# Patient Record
Sex: FEMALE | Race: WHITE | Employment: UNEMPLOYED | URBAN - METROPOLITAN AREA
[De-identification: names, ages, dates, MRNs, and addresses within clinical notes are randomized per-mention and may not be internally consistent; named-entity substitution may affect disease eponyms.]

---
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
## 2022-02-24 ENCOUNTER — TELEMEDICINE (OUTPATIENT)
Dept: PSYCHOLOGY | Age: 22
End: 2022-02-24
Payer: COMMERCIAL

## 2022-02-24 DIAGNOSIS — F41.8 OTHER SPECIFIED ANXIETY DISORDERS: Primary | ICD-10-CM

## 2022-02-24 PROCEDURE — 90832 PSYTX W PT 30 MINUTES: CPT | Performed by: PSYCHOLOGIST

## 2022-02-24 NOTE — PROGRESS NOTES
amounts, but of unhealthy foods such as processed peanut butter crackers and pizza. Discussed how to use smart goals for diet change. Suggested that she research the FDA my plate standards. She will start with adding breakfast to her diet. She is also exercising, advised that she talk to someone at her gym who can recommend an exercise plan. Treatment plan goal(s) addressed: To reduce the frequency and severity of anxiety. Homework/recommendations:  See above    Progress and patient response since intake/last session:  Maintaining past treatment gains. She has made very good progress, continuing on intermittent maintenance. Will call for next appointment when needed.         Electronically signed by Jim Bowen, PhD

## 2023-01-12 ENCOUNTER — TELEMEDICINE (OUTPATIENT)
Dept: PSYCHOLOGY | Age: 23
End: 2023-01-12
Payer: COMMERCIAL

## 2023-01-12 DIAGNOSIS — F41.8 OTHER SPECIFIED ANXIETY DISORDERS: Primary | ICD-10-CM

## 2023-01-12 PROCEDURE — 90837 PSYTX W PT 60 MINUTES: CPT | Performed by: PSYCHOLOGIST

## 2023-01-12 ASSESSMENT — ANXIETY QUESTIONNAIRES
7. FEELING AFRAID AS IF SOMETHING AWFUL MIGHT HAPPEN: 0
3. WORRYING TOO MUCH ABOUT DIFFERENT THINGS: 1
GAD7 TOTAL SCORE: 5
IF YOU CHECKED OFF ANY PROBLEMS ON THIS QUESTIONNAIRE, HOW DIFFICULT HAVE THESE PROBLEMS MADE IT FOR YOU TO DO YOUR WORK, TAKE CARE OF THINGS AT HOME, OR GET ALONG WITH OTHER PEOPLE: NOT DIFFICULT AT ALL
6. BECOMING EASILY ANNOYED OR IRRITABLE: 2
5. BEING SO RESTLESS THAT IT IS HARD TO SIT STILL: 0
2. NOT BEING ABLE TO STOP OR CONTROL WORRYING: 0
1. FEELING NERVOUS, ANXIOUS, OR ON EDGE: 1
4. TROUBLE RELAXING: 1

## 2023-01-12 ASSESSMENT — PATIENT HEALTH QUESTIONNAIRE - PHQ9
SUM OF ALL RESPONSES TO PHQ QUESTIONS 1-9: 6
SUM OF ALL RESPONSES TO PHQ QUESTIONS 1-9: 6
9. THOUGHTS THAT YOU WOULD BE BETTER OFF DEAD, OR OF HURTING YOURSELF: 0
5. POOR APPETITE OR OVEREATING: 1
2. FEELING DOWN, DEPRESSED OR HOPELESS: 0
4. FEELING TIRED OR HAVING LITTLE ENERGY: 0
7. TROUBLE CONCENTRATING ON THINGS, SUCH AS READING THE NEWSPAPER OR WATCHING TELEVISION: 0
6. FEELING BAD ABOUT YOURSELF - OR THAT YOU ARE A FAILURE OR HAVE LET YOURSELF OR YOUR FAMILY DOWN: 1
SUM OF ALL RESPONSES TO PHQ QUESTIONS 1-9: 6
1. LITTLE INTEREST OR PLEASURE IN DOING THINGS: 1
10. IF YOU CHECKED OFF ANY PROBLEMS, HOW DIFFICULT HAVE THESE PROBLEMS MADE IT FOR YOU TO DO YOUR WORK, TAKE CARE OF THINGS AT HOME, OR GET ALONG WITH OTHER PEOPLE: 0
8. MOVING OR SPEAKING SO SLOWLY THAT OTHER PEOPLE COULD HAVE NOTICED. OR THE OPPOSITE, BEING SO FIGETY OR RESTLESS THAT YOU HAVE BEEN MOVING AROUND A LOT MORE THAN USUAL: 2
SUM OF ALL RESPONSES TO PHQ9 QUESTIONS 1 & 2: 1
3. TROUBLE FALLING OR STAYING ASLEEP: 1
SUM OF ALL RESPONSES TO PHQ QUESTIONS 1-9: 6

## 2023-01-12 NOTE — PROGRESS NOTES
[address] [place] [address]    TeleMedicine Patient Consent    This visit was performed as a virtual video visit using a synchronous, two-way, audio-video telehealth technology platform. Patient identification was verified at the start of the visit, including the patient's telephone number and physical location. I discussed with the patient the nature of our telehealth visits, that:     There are potential benefits and risks of video-conferencing (e.g. limits to patient confidentiality) that differ from in-person sessions. Confidentiality still applies for telepsychology services, and nobody will record the session without the permission from the others person(s). We agree to use the video-conferencing platform selected for our virtual sessions, and the psychologist will explain how to use it. You need to use a webcam or smartphone during the session. It is important to be in a quiet, private space that is free of distractions (including cell phone or other devices) during the session. It is important to use a secure internet connection rather than public/free Wi-Fi. We need a back-up plan (e.g., phone number where you can be reached) to restart the session or to reschedule it, in the event of technical problems. We need a safety plan that includes at least one emergency contact and the closest ER to your location, in the event of a crisis situation. If you are not an adult, we need the permission of your parent or legal guardian (and their contact information) for you to participate in telepsychology sessions. As your psychologist, I may determine that due to certain circumstances, telepsychology is no longer appropriate and that we should resume our sessions in-person.    As a TeleMedicine visit, this encounter will generate charges, similar to office visits, which may or may not be fully paid by the insurance, so the patient may be financially responsible for this encounter. Patient does agree to proceed with telemedicine consultation. Patient's location: home address in [place] [phone]    Provider location:  Office - [facility]         [facility]    *Add 95 modifier to all Video Visits*    Time Start: 9:40 a.m. Time End:  10:35 a.m. Date of Service:  1/12/2023  Session #: 8    Subjective:  Resuming therapy after last being treated by this provider in Feb 2022. Has had increased stressors, see below. Objective:  Mental status:    Appearance: Appears stated age   Affect:  consistent with expectations based upon mood   Mood:   Anxious   Thought Process:  Linear and goal directed   Thought Content: no evidence of psychosis   Behavior:   Cooperative   Psychomotor: Within normal limits   Speech: Within normal limits   Eye Contact: fair   Orientation:  oriented to person, place, time, and general circumstances   Judgment & Insight:  normal insight and judgment     Objective Testing:  PHQ-9 = 6; PARKER-7 = 5    Assessment:   Progress/response since intake/last session: Doing well in college classes. Getting along well with family. 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 Risk: denies current suicidal ideation, plan and intent; denies current homicidal ideation, plan and intent. Protective Factors: denial of impulses, future goals, and making progress in treatment      ICD-10-CM    1. Other specified anxiety disorders  F41.8           Psychotherapy Intervention:  Modalities: Review of symptoms; Cognitive Behavioral Therapy    Melquiades Chan reported she scheduled the appointment today to discuss some distress about an incident when she was 8years old. She has cerebral palsy and had a surgery at that time on her left leg to improve her mobility (she has cerebral palsy).   Stated a family friend who is a physical therapist was helping her with mobility and walking shortly after the surgery, and she felt pressured to walk a few days after the surgery and to do so in front of several family members. This has started to bother her again with no clear trigger. Discussed how to find peace with this situation by forgiving this individual through understanding her point of view. She also understands that doing physical therapy exercises as quickly as possible after surgery may have likely been what allows her to have the amount of mobility she has today. She is having some anxiety about her upcoming coursework. Taking two virtual classes through [employer] She is unclear about the requirements for one of her classes. Encouraged her to set up a meeting with the instructor to clarify. Working at [employer]. 19-20 hrs/ week. Feels she cannot manage the current work schedule with her classes; discussed how to negotiate with her supervisor on the issue. Overall her panic attacks are reduced in frequency compared to the past.  She has had just 1 in the past month. Yesterday she had a 5 to 10-minute panic attack while exercising, went to the restroom to cry and was able to resume her exercise. Exercises 3 to 4 days/week. She is working on improving her diet, for example choosing healthier options for snacks. Feels her mother is a good source of support at this time, and they have worked through many of the conflicts discussed with this provider in the past.    [employer]. No drivers license. No insomnia - sleeps 7-8 hours per night. Denies alcohol, drugs, and tobacco. Will be Cousin's maid of honor July 29. Treatment goal(s) addressed: To reduce the frequency and severity of anxiety. Will create updated treatment plan next visit. Plan:  Homework/recommendations: See above    Follow up:  2-3 weeks, scheduled for 1/30/23.      Electronically signed by Kemal Quispe PhD    _____________________________________________________________________________________

## 2023-02-09 ENCOUNTER — TELEMEDICINE (OUTPATIENT)
Dept: PSYCHOLOGY | Age: 23
End: 2023-02-09
Payer: COMMERCIAL

## 2023-02-09 DIAGNOSIS — F41.8 OTHER SPECIFIED ANXIETY DISORDERS: Primary | ICD-10-CM

## 2023-02-09 PROCEDURE — 90832 PSYTX W PT 30 MINUTES: CPT | Performed by: PSYCHOLOGIST

## 2023-02-10 NOTE — PROGRESS NOTES
[address] [place] [address]    TeleMedicine Patient Consent    This visit was performed as a virtual video visit using a synchronous, two-way, audio-video telehealth technology platform. Patient identification was verified at the start of the visit, including the patient's telephone number and physical location. I discussed with the patient the nature of our telehealth visits, that:     There are potential benefits and risks of video-conferencing (e.g. limits to patient confidentiality) that differ from in-person sessions. Confidentiality still applies for telepsychology services, and nobody will record the session without the permission from the others person(s). We agree to use the video-conferencing platform selected for our virtual sessions, and the psychologist will explain how to use it. You need to use a webcam or smartphone during the session. It is important to be in a quiet, private space that is free of distractions (including cell phone or other devices) during the session. It is important to use a secure internet connection rather than public/free Wi-Fi. We need a back-up plan (e.g., phone number where you can be reached) to restart the session or to reschedule it, in the event of technical problems. We need a safety plan that includes at least one emergency contact and the closest ER to your location, in the event of a crisis situation. If you are not an adult, we need the permission of your parent or legal guardian (and their contact information) for you to participate in telepsychology sessions. As your psychologist, I may determine that due to certain circumstances, telepsychology is no longer appropriate and that we should resume our sessions in-person.    As a TeleMedicine visit, this encounter will generate charges, similar to office visits, which may or may not be fully paid by the insurance, so the patient may be financially responsible for this encounter. Patient does agree to proceed with telemedicine consultation. Patient's location: home address in [address] [address]    Provider location:  Office - [facility] Yanique modifier to all Video Visits*    Time Start: 2:00 p.m. Time End:  2:30 p.m. Date of Service:  2/9/2023  Session #: 9    Subjective:  Feels anxiety is generally well controlled. Two incidents discussed, see below. Objective:  Mental status:    Appearance: Appears stated age   Affect:  consistent with expectations based upon mood   Mood:   Mildly stressed   Thought Process:  Linear and goal directed   Thought Content: no evidence of psychosis   Behavior:   Cooperative   Psychomotor: Within normal limits   Speech: Within normal limits   Eye Contact: fair   Orientation:  oriented to person, place, time, and general circumstances   Judgment & Insight:  normal insight and judgment       Assessment:   Progress/response since intake/last session: Was able to work through stress of remembering being mistreated when she was age 8 by forgiving the individual, see previous service note. 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 Risk: denies current suicidal ideation, plan and intent; denies current homicidal ideation, plan and intent. Protective Factors: denial of impulses, future goals, and making progress in treatment      ICD-10-CM    1. Other specified anxiety disorders  F41.8             Psychotherapy Intervention:  Modalities: Review of symptoms; Cognitive Behavioral Therapy    Reinforced efforts she made to forgive the individual that mistreated her so that she can find peace and does not feel anxious by the situation any longer. Her main concern is her brother's move.   He is age 21 and will be taking a temporary position at a college 3 hours away as a . They have always lived together and this has been a major change for her. Discussed how to cope with his move. They talk nearly every day and he will be traveling home some weekends. Emphasized that considering this is a good opportunity for him for success, may also help her to cope. Treatment goal(s) addressed: To reduce the frequency and severity of anxiety. She feels the concerns that prompted her to schedule the last two visits are resolved. Will create updated treatment plan if she requires continuous care. Plan:  Homework/recommendations: See above    Follow up:  As needed.     Electronically signed by Maryuri Monday, PhD    _____________________________________________________________________________________

## 2023-05-06 ENCOUNTER — APPOINTMENT (OUTPATIENT)
Dept: GENERAL RADIOLOGY | Age: 23
End: 2023-05-06
Payer: COMMERCIAL

## 2023-05-06 ENCOUNTER — HOSPITAL ENCOUNTER (EMERGENCY)
Age: 23
Discharge: HOME | End: 2023-05-06
Payer: COMMERCIAL

## 2023-05-06 VITALS
RESPIRATION RATE: 18 BRPM | WEIGHT: 174 LBS | TEMPERATURE: 99.7 F | SYSTOLIC BLOOD PRESSURE: 141 MMHG | OXYGEN SATURATION: 95 % | HEART RATE: 97 BPM | HEIGHT: 63 IN | DIASTOLIC BLOOD PRESSURE: 86 MMHG | BODY MASS INDEX: 30.83 KG/M2

## 2023-05-06 DIAGNOSIS — J40 BRONCHITIS: Primary | ICD-10-CM

## 2023-05-06 LAB
ANION GAP SERPL CALCULATED.3IONS-SCNC: 15 MMOL/L (ref 7–16)
BASOPHILS # BLD: 0.02 E9/L (ref 0–0.2)
BASOPHILS NFR BLD: 0.3 % (ref 0–2)
BUN SERPL-MCNC: 10 MG/DL (ref 6–20)
CALCIUM SERPL-MCNC: 9 MG/DL (ref 8.6–10.2)
CHLORIDE SERPL-SCNC: 100 MMOL/L (ref 98–107)
CO2 SERPL-SCNC: 21 MMOL/L (ref 22–29)
CREAT SERPL-MCNC: 0.7 MG/DL (ref 0.5–1)
EOSINOPHIL # BLD: 0.02 E9/L (ref 0.05–0.5)
EOSINOPHIL NFR BLD: 0.3 % (ref 0–6)
ERYTHROCYTE [DISTWIDTH] IN BLOOD BY AUTOMATED COUNT: 14 FL (ref 11.5–15)
GLUCOSE SERPL-MCNC: 93 MG/DL (ref 74–99)
HCT VFR BLD AUTO: 41.5 % (ref 34–48)
HGB BLD-MCNC: 13.3 G/DL (ref 11.5–15.5)
IMM GRANULOCYTES # BLD: 0.02 E9/L
IMM GRANULOCYTES NFR BLD: 0.3 % (ref 0–5)
LYMPHOCYTES # BLD: 0.86 E9/L (ref 1.5–4)
LYMPHOCYTES NFR BLD: 11.2 % (ref 20–42)
MCH RBC QN AUTO: 25.8 PG (ref 26–35)
MCHC RBC AUTO-ENTMCNC: 32 % (ref 32–34.5)
MCV RBC AUTO: 80.6 FL (ref 80–99.9)
MONOCYTES # BLD: 0.65 E9/L (ref 0.1–0.95)
MONOCYTES NFR BLD: 8.5 % (ref 2–12)
NEUTROPHILS # BLD: 6.1 E9/L (ref 1.8–7.3)
NEUTS SEG NFR BLD: 79.4 % (ref 43–80)
PLATELET # BLD AUTO: 228 E9/L (ref 130–450)
PMV BLD AUTO: 10.1 FL (ref 7–12)
POTASSIUM SERPL-SCNC: 3.7 MMOL/L (ref 3.5–5)
RBC # BLD AUTO: 5.15 E12/L (ref 3.5–5.5)
SARS-COV-2 RDRP RESP QL NAA+PROBE: NOT DETECTED
SODIUM SERPL-SCNC: 136 MMOL/L (ref 132–146)
WBC # BLD: 7.7 E9/L (ref 4.5–11.5)

## 2023-05-06 PROCEDURE — 87635 SARS-COV-2 COVID-19 AMP PRB: CPT

## 2023-05-06 PROCEDURE — 94664 DEMO&/EVAL PT USE INHALER: CPT

## 2023-05-06 PROCEDURE — 6370000000 HC RX 637 (ALT 250 FOR IP): Performed by: PHYSICIAN ASSISTANT

## 2023-05-06 PROCEDURE — 85025 COMPLETE CBC W/AUTO DIFF WBC: CPT

## 2023-05-06 PROCEDURE — 99284 EMERGENCY DEPT VISIT MOD MDM: CPT

## 2023-05-06 PROCEDURE — 71046 X-RAY EXAM CHEST 2 VIEWS: CPT

## 2023-05-06 PROCEDURE — 80048 BASIC METABOLIC PNL TOTAL CA: CPT

## 2023-05-06 RX ORDER — IPRATROPIUM BROMIDE AND ALBUTEROL SULFATE 2.5; .5 MG/3ML; MG/3ML
1 SOLUTION RESPIRATORY (INHALATION) EVERY 4 HOURS PRN
Qty: 360 ML | Refills: 1 | Status: SHIPPED | OUTPATIENT
Start: 2023-05-06

## 2023-05-06 RX ORDER — IPRATROPIUM BROMIDE AND ALBUTEROL SULFATE 2.5; .5 MG/3ML; MG/3ML
1 SOLUTION RESPIRATORY (INHALATION)
Status: DISCONTINUED | OUTPATIENT
Start: 2023-05-06 | End: 2023-05-06 | Stop reason: HOSPADM

## 2023-05-06 RX ORDER — PREDNISONE 20 MG/1
20 TABLET ORAL 2 TIMES DAILY
Qty: 10 TABLET | Refills: 0 | Status: SHIPPED | OUTPATIENT
Start: 2023-05-06 | End: 2023-05-11

## 2023-05-06 RX ADMIN — IPRATROPIUM BROMIDE AND ALBUTEROL SULFATE 1 AMPULE: .5; 2.5 SOLUTION RESPIRATORY (INHALATION) at 14:23

## 2023-05-06 NOTE — ED PROVIDER NOTES
Independent [facility] Visit. HPI:  5/6/23,   Time: 2:01 PM EDT         Gina Wall is a 23 y.o. female presenting to the ED for a cough x 1 month. Pt. States the cough is productive with yellow mucus. She reports low grade fevers and nasal congestions. She denies hx of asthma. She is taking over-the-counter cough medicines with no relief. She was evaluated at urgent care 2 days ago and given Augmentin. No relief. She denies any body aches, chills, headache or dizziness, syncope, ear pain, sinus pain, dysphagia, shortness of breath, chest pain, wheezing, abdominal pain, nausea, vomiting or diarrhea. ROS:   Pertinent positives and negatives are stated within HPI, all other systems reviewed and are negative.  --------------------------------------------- PAST HISTORY ---------------------------------------------  Past Medical History:  has no past medical history on file. Past Surgical History:  has no past surgical history on file. Social History:      Family History: family history is not on file. The patients home medications have been reviewed.     Allergies: Mucinex [guaifenesin er]    -------------------------------------------------- RESULTS -------------------------------------------------  All laboratory and radiology results have been personally reviewed by myself   LABS:  Results for orders placed or performed during the hospital encounter of 05/06/23   COVID-19, Rapid    Specimen: Nasopharyngeal Swab   Result Value Ref Range    SARS-CoV-2, NAAT Not Detected Not Detected   CBC with Auto Differential   Result Value Ref Range    WBC 7.7 4.5 - 11.5 E9/L    RBC 5.15 3.50 - 5.50 E12/L    Hemoglobin 13.3 11.5 - 15.5 g/dL    Hematocrit 41.5 34.0 - 48.0 %    MCV 80.6 80.0 - 99.9 fL    MCH 25.8 (L) 26.0 - 35.0 pg    MCHC 32.0 32.0 - 34.5 %    RDW 14.0 11.5 - 15.0 fL    Platelets 644 241 - 566 E9/L    MPV 10.1 7.0 - 12.0 fL    Neutrophils % 79.4 43.0 - 80.0 %    Immature Granulocytes % 0.3 0.0 - 5.0 %

## 2023-08-30 SDOH — HEALTH STABILITY: PHYSICAL HEALTH: ON AVERAGE, HOW MANY DAYS PER WEEK DO YOU ENGAGE IN MODERATE TO STRENUOUS EXERCISE (LIKE A BRISK WALK)?: 2 DAYS

## 2023-08-30 SDOH — HEALTH STABILITY: PHYSICAL HEALTH: ON AVERAGE, HOW MANY MINUTES DO YOU ENGAGE IN EXERCISE AT THIS LEVEL?: 130 MIN

## 2023-08-30 ASSESSMENT — SOCIAL DETERMINANTS OF HEALTH (SDOH)
WITHIN THE LAST YEAR, HAVE YOU BEEN AFRAID OF YOUR PARTNER OR EX-PARTNER?: NO
WITHIN THE LAST YEAR, HAVE YOU BEEN KICKED, HIT, SLAPPED, OR OTHERWISE PHYSICALLY HURT BY YOUR PARTNER OR EX-PARTNER?: NO
WITHIN THE LAST YEAR, HAVE YOU BEEN HUMILIATED OR EMOTIONALLY ABUSED IN OTHER WAYS BY YOUR PARTNER OR EX-PARTNER?: NO
WITHIN THE LAST YEAR, HAVE TO BEEN RAPED OR FORCED TO HAVE ANY KIND OF SEXUAL ACTIVITY BY YOUR PARTNER OR EX-PARTNER?: NO

## 2023-08-30 NOTE — PROGRESS NOTES
[facility]  Department of Family Medicine  Family Medicine Residency Program      Patient: Lay Light 26 y.o. female     Date of Service: 8/30/23      Chief complaint:   Chief Complaint   Patient presents with    New Patient    Other     Needs pre op for wisdom teeth in hospital       HISTORY OF PRESENTING ILLNESS     26 y.o. female [address] left-sided hemiplegic CP, benign heart murmur, anxiety resolved hx pulmonary stenosis presented to the clinic to establish with me.     Counseling and medication through [facility], anxiety controlled on Celexa 40 mg     Dr. Aime Bach oral surgeon for wisdom teeth removal; procedure at hospital or [address] [place] [address]; hx tonsillectomy 2009, botox in leg and arm, heel cord lengthening 2008, 2013 left side, arm and hand 2019; tolerated both general and twilight anesthesia without difficulty, just \"takes a while to wake up,\" per Mom but no problems with heart or breathing; twilight procedure; patient without chest pain or shortness of breath on exertion      Health Maintenance:  Health Maintenance Due   Topic Date Due    COVID-19 Vaccine (1) Never done    HPV vaccine (1 - 2-dose series) Never done    HIV screen  Never done    Chlamydia/GC screen  Never done    Varicella vaccine (2 of 2 - 2-dose childhood series) 06/18/2019    Hepatitis C screen  Never done    Flu vaccine (1) Never done    Pap smear  Never done     Past Medical History:      Diagnosis Date    Anxiety     Benign heart murmur     Hx of pulmonary artery stenosis     resolved    Left-sided hemiplegic cerebral palsy [facility])      Past Surgical History:        Procedure Laterality Date    FOOT CAPSULE RELEASE W/ PERCUTANEOUS HEEL CORD LENGTHENING, TIBIAL TENDON TRANSFER      2008, 2013    TONSILLECTOMY  2009     Allergies:    Mucinex [guaifenesin er]  Social History:   Social History     Socioeconomic History    Marital status: Single     Spouse name: Not on file    Number of children: Not on file    Years of

## 2023-08-31 ENCOUNTER — OFFICE VISIT (OUTPATIENT)
Dept: FAMILY MEDICINE CLINIC | Age: 23
End: 2023-08-31

## 2023-08-31 VITALS
OXYGEN SATURATION: 98 % | HEIGHT: 63 IN | DIASTOLIC BLOOD PRESSURE: 85 MMHG | SYSTOLIC BLOOD PRESSURE: 115 MMHG | RESPIRATION RATE: 16 BRPM | BODY MASS INDEX: 30.3 KG/M2 | TEMPERATURE: 97.6 F | HEART RATE: 74 BPM | WEIGHT: 171 LBS

## 2023-08-31 DIAGNOSIS — Z01.818 PREOPERATIVE EXAMINATION: Primary | ICD-10-CM

## 2023-08-31 DIAGNOSIS — G80.8 LEFT-SIDED HEMIPLEGIC CEREBRAL PALSY (HCC): ICD-10-CM

## 2023-08-31 DIAGNOSIS — R01.0 BENIGN HEART MURMUR: ICD-10-CM

## 2023-08-31 DIAGNOSIS — F41.9 ANXIETY: ICD-10-CM

## 2023-08-31 RX ORDER — CITALOPRAM 40 MG/1
40 TABLET ORAL DAILY
COMMUNITY

## 2023-08-31 SDOH — ECONOMIC STABILITY: INCOME INSECURITY: HOW HARD IS IT FOR YOU TO PAY FOR THE VERY BASICS LIKE FOOD, HOUSING, MEDICAL CARE, AND HEATING?: NOT HARD AT ALL

## 2023-08-31 SDOH — ECONOMIC STABILITY: HOUSING INSECURITY
IN THE LAST 12 MONTHS, WAS THERE A TIME WHEN YOU DID NOT HAVE A STEADY PLACE TO SLEEP OR SLEPT IN A SHELTER (INCLUDING NOW)?: NO

## 2023-08-31 SDOH — ECONOMIC STABILITY: FOOD INSECURITY: WITHIN THE PAST 12 MONTHS, THE FOOD YOU BOUGHT JUST DIDN'T LAST AND YOU DIDN'T HAVE MONEY TO GET MORE.: NEVER TRUE

## 2023-08-31 SDOH — ECONOMIC STABILITY: FOOD INSECURITY: WITHIN THE PAST 12 MONTHS, YOU WORRIED THAT YOUR FOOD WOULD RUN OUT BEFORE YOU GOT MONEY TO BUY MORE.: NEVER TRUE

## 2023-08-31 ASSESSMENT — ENCOUNTER SYMPTOMS
NAUSEA: 0
ABDOMINAL PAIN: 0
VOMITING: 0
CONSTIPATION: 0
DIARRHEA: 0
SHORTNESS OF BREATH: 0

## 2023-09-26 ENCOUNTER — OFFICE VISIT (OUTPATIENT)
Dept: FAMILY MEDICINE CLINIC | Age: 23
End: 2023-09-26

## 2023-09-26 VITALS
RESPIRATION RATE: 16 BRPM | HEART RATE: 77 BPM | BODY MASS INDEX: 30.65 KG/M2 | HEIGHT: 63 IN | WEIGHT: 173 LBS | TEMPERATURE: 98 F | OXYGEN SATURATION: 98 % | SYSTOLIC BLOOD PRESSURE: 136 MMHG | DIASTOLIC BLOOD PRESSURE: 86 MMHG

## 2023-09-26 DIAGNOSIS — J01.90 ACUTE BACTERIAL SINUSITIS: Primary | ICD-10-CM

## 2023-09-26 DIAGNOSIS — B96.89 ACUTE BACTERIAL SINUSITIS: Primary | ICD-10-CM

## 2023-09-26 PROCEDURE — 99213 OFFICE O/P EST LOW 20 MIN: CPT

## 2023-09-26 RX ORDER — AMOXICILLIN 875 MG/1
875 TABLET, COATED ORAL 2 TIMES DAILY
Qty: 14 TABLET | Refills: 0 | Status: SHIPPED | OUTPATIENT
Start: 2023-09-26 | End: 2023-10-03

## 2023-09-26 ASSESSMENT — ENCOUNTER SYMPTOMS
VOMITING: 0
ABDOMINAL PAIN: 0
NAUSEA: 0
RHINORRHEA: 1
SHORTNESS OF BREATH: 0
CONSTIPATION: 0
COUGH: 1
DIARRHEA: 0

## 2023-09-26 NOTE — PROGRESS NOTES
[facility]  Department of Family Medicine  Family Medicine Residency Program      Patient: Alpa Burkett 26 y.o. female   Chief complaint:   Chief Complaint   Patient presents with    Cough     Taking Nyquil    Congestion     X 1 week.  Yellow mucous       HISTORY OF PRESENTING ILLNESS     26 y.o. female presented to the clinic for cough productive yellow mucus, congestion, rhinorrhea, sinus tenderness and headache for 10 days; taking sudafed and nyquil with some relief    Has allegra, hasn't taken    Has albuterol inhaler without relief    Health Maintenance:  Health Maintenance Due   Topic Date Due    COVID-19 Vaccine (1) Never done    HPV vaccine (1 - 2-dose series) Never done    HIV screen  Never done    Chlamydia/GC screen  Never done    Varicella vaccine (2 of 2 - 2-dose childhood series) 06/18/2019    Hepatitis C screen  Never done    Flu vaccine (1) Never done    Pap smear  Never done     Past Medical History:      Diagnosis Date    Anxiety     Benign heart murmur     Hx of pulmonary artery stenosis     resolved    Left-sided hemiplegic cerebral palsy ([address])      Past Surgical History:        Procedure Laterality Date    FOOT CAPSULE RELEASE W/ PERCUTANEOUS HEEL CORD LENGTHENING, TIBIAL TENDON TRANSFER      2008, 2013    TONSILLECTOMY  2009     Allergies:    Mucinex [guaifenesin er]  Social History:   Social History     Socioeconomic History    Marital status: Single     Spouse name: Not on file    Number of children: Not on file    Years of education: Not on file    Highest education level: Not on file   Occupational History    Not on file   Tobacco Use    Smoking status: Never     Passive exposure: Never    Smokeless tobacco: Never   Substance and Sexual Activity    Alcohol use: Never    Drug use: Never    Sexual activity: Not on file   Other Topics Concern    Not on file   Social History Narrative    Not on file     Social Determinants of Health     Financial Resource Strain: Low

## 2023-10-11 ENCOUNTER — OFFICE VISIT (OUTPATIENT)
Dept: FAMILY MEDICINE CLINIC | Age: 23
End: 2023-10-11
Payer: COMMERCIAL

## 2023-10-11 VITALS
BODY MASS INDEX: 30.48 KG/M2 | HEART RATE: 92 BPM | WEIGHT: 172 LBS | OXYGEN SATURATION: 100 % | DIASTOLIC BLOOD PRESSURE: 75 MMHG | HEIGHT: 63 IN | RESPIRATION RATE: 18 BRPM | SYSTOLIC BLOOD PRESSURE: 108 MMHG

## 2023-10-11 DIAGNOSIS — R05.3 PERSISTENT COUGH: Primary | ICD-10-CM

## 2023-10-11 PROCEDURE — 99213 OFFICE O/P EST LOW 20 MIN: CPT

## 2023-10-11 PROCEDURE — G8427 DOCREV CUR MEDS BY ELIG CLIN: HCPCS

## 2023-10-11 PROCEDURE — 1036F TOBACCO NON-USER: CPT

## 2023-10-11 PROCEDURE — G8417 CALC BMI ABV UP PARAM F/U: HCPCS

## 2023-10-11 PROCEDURE — G8484 FLU IMMUNIZE NO ADMIN: HCPCS

## 2023-10-11 RX ORDER — BENZONATATE 100 MG/1
100 CAPSULE ORAL 3 TIMES DAILY PRN
Qty: 30 CAPSULE | Refills: 0 | Status: SHIPPED | OUTPATIENT
Start: 2023-10-11 | End: 2023-10-21

## 2023-10-11 ASSESSMENT — ENCOUNTER SYMPTOMS
STRIDOR: 0
ABDOMINAL PAIN: 0
SINUS PRESSURE: 0
CHEST TIGHTNESS: 0
SHORTNESS OF BREATH: 0
WHEEZING: 0
EYE REDNESS: 0
CONSTIPATION: 0
DIARRHEA: 0
VOMITING: 0
EYE DISCHARGE: 0
VOICE CHANGE: 1
SORE THROAT: 1

## 2023-10-11 NOTE — PROGRESS NOTES
Subjective:  Alpa Burkett is a 26 y.o. female with chief complaint of Cough (Was here on 09.26, given amox, all symptoms have subsided except cough, feels like there should be production but nothing comes up, every once in awhile it does and it's yellow )      HPI:  HPI    Here for cough  Recent URI, given abx, recently finished. Has had lingering cough  Cough is dry, non productive  Now hoarse  No emesis  Pain with swallowing food and liquids  No sick contacts  Nyquil as needed  Denies fever or chills      ROS:  Review of Systems   Constitutional:  Negative for chills, fatigue and fever. HENT:  Positive for sore throat and voice change. Negative for congestion and sinus pressure. Eyes:  Negative for discharge and redness. Respiratory:  Negative for chest tightness, shortness of breath, wheezing and stridor. Cardiovascular:  Negative for chest pain and palpitations. Gastrointestinal:  Negative for abdominal pain, constipation, diarrhea and vomiting. Genitourinary:  Negative for difficulty urinating and hematuria. Musculoskeletal:  Negative for arthralgias and myalgias. Skin:  Negative for rash and wound. Neurological:  Negative for weakness and headaches. Psychiatric/Behavioral:  Negative for agitation. The patient is not nervous/anxious. Objective:  Vitals:    10/11/23 1310   BP: 108/75   Pulse: 92   Resp: 18   SpO2: 100%   Weight: 172 lb (78 kg)   Height: 5' 3\" (1.6 m)     Physical Exam  Vitals and nursing note reviewed. Constitutional:       General: She is not in acute distress. Appearance: Normal appearance. HENT:      Head: Normocephalic and atraumatic. Mouth/Throat:      Mouth: Mucous membranes are moist.      Pharynx: No oropharyngeal exudate or posterior oropharyngeal erythema. Eyes:      General:         Right eye: No discharge. Left eye: No discharge. Cardiovascular:      Rate and Rhythm: Normal rate and regular rhythm.       Heart sounds: No murmur

## 2023-12-29 ENCOUNTER — OFFICE VISIT (OUTPATIENT)
Dept: FAMILY MEDICINE CLINIC | Age: 23
End: 2023-12-29
Payer: COMMERCIAL

## 2023-12-29 VITALS
DIASTOLIC BLOOD PRESSURE: 85 MMHG | HEIGHT: 63 IN | BODY MASS INDEX: 30.48 KG/M2 | WEIGHT: 172 LBS | OXYGEN SATURATION: 99 % | TEMPERATURE: 97.1 F | SYSTOLIC BLOOD PRESSURE: 112 MMHG | RESPIRATION RATE: 16 BRPM | HEART RATE: 69 BPM

## 2023-12-29 DIAGNOSIS — Z00.00 ANNUAL PHYSICAL EXAM: Primary | ICD-10-CM

## 2023-12-29 PROCEDURE — G8484 FLU IMMUNIZE NO ADMIN: HCPCS

## 2023-12-29 PROCEDURE — 99395 PREV VISIT EST AGE 18-39: CPT

## 2023-12-29 NOTE — PROGRESS NOTES
[facility]  Department of Family Medicine  Family Medicine Residency Program      Patient: Deedee Worthington 26 y.o. female     Date of Service: 12/29/23      Chief complaint:   Chief Complaint   Patient presents with    Annual Exam       HISTORY OF PRESENTING ILLNESS     26 y.o. 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  Controlled on Celexa 40 mg qd  Denies SI, self harm    Denies chest pin, SOB, palpitations  Never been sexually active  Menstrual cycle regular  Denies etoh abuse, tobacco use, illicit drug use      Health Maintenance:  Health Maintenance Due   Topic Date Due    COVID-19 Vaccine (1) Never done    HPV vaccine (1 - 2-dose series) Never done    HIV screen  Never done    Chlamydia/GC screen  Never done    Varicella vaccine (2 of 2 - 2-dose childhood series) 06/18/2019    Hepatitis C screen  Never done    Flu vaccine (1) Never done    Pap smear  Never done    Depression Screen  01/12/2024     Past Medical History:      Diagnosis Date    Anxiety     Benign heart murmur     Hx of pulmonary artery stenosis     resolved    Left-sided hemiplegic cerebral palsy ([address])      Past Surgical History:        Procedure Laterality Date    FOOT CAPSULE RELEASE W/ PERCUTANEOUS HEEL CORD LENGTHENING, TIBIAL TENDON TRANSFER      2008, 2013    TONSILLECTOMY  2009     Allergies:    Mucinex [guaifenesin er]  Social History:   Social History     Socioeconomic History    Marital status: Single     Spouse name: Not on file    Number of children: Not on file    Years of education: Not on file    Highest education level: Not on file   Occupational History    Not on file   Tobacco Use    Smoking status: Never     Passive exposure: Never    Smokeless tobacco: Never   Substance and Sexual Activity    Alcohol use: Never    Drug use: Never    Sexual

## 2023-12-29 NOTE — PROGRESS NOTES
This patient was screened with SUBS screening tool.    On 12/29/2023 the patient has a Positive Screen and the result can be found scanned into the chart

## 2024-03-05 ENCOUNTER — OFFICE VISIT (OUTPATIENT)
Dept: FAMILY MEDICINE CLINIC | Age: 24
End: 2024-03-05
Payer: COMMERCIAL

## 2024-03-05 VITALS
OXYGEN SATURATION: 98 % | RESPIRATION RATE: 16 BRPM | HEART RATE: 65 BPM | HEIGHT: 63 IN | TEMPERATURE: 98 F | SYSTOLIC BLOOD PRESSURE: 117 MMHG | BODY MASS INDEX: 31.18 KG/M2 | WEIGHT: 176 LBS | DIASTOLIC BLOOD PRESSURE: 82 MMHG

## 2024-03-05 DIAGNOSIS — Z01.818 PREOPERATIVE EXAMINATION: ICD-10-CM

## 2024-03-05 PROCEDURE — G8427 DOCREV CUR MEDS BY ELIG CLIN: HCPCS

## 2024-03-05 PROCEDURE — 99213 OFFICE O/P EST LOW 20 MIN: CPT

## 2024-03-05 PROCEDURE — 1036F TOBACCO NON-USER: CPT

## 2024-03-05 PROCEDURE — G8484 FLU IMMUNIZE NO ADMIN: HCPCS

## 2024-03-05 PROCEDURE — G8417 CALC BMI ABV UP PARAM F/U: HCPCS

## 2024-03-05 ASSESSMENT — PATIENT HEALTH QUESTIONNAIRE - PHQ9
SUM OF ALL RESPONSES TO PHQ QUESTIONS 1-9: 0
SUM OF ALL RESPONSES TO PHQ9 QUESTIONS 1 & 2: 0
SUM OF ALL RESPONSES TO PHQ QUESTIONS 1-9: 0
2. FEELING DOWN, DEPRESSED OR HOPELESS: 0
1. LITTLE INTEREST OR PLEASURE IN DOING THINGS: 0

## 2024-03-05 NOTE — PROGRESS NOTES
[facility]  Precepting Note    Subjective:  Preoperative evaluation  Will have wisdom  She has h/o anesthesia given   Prolonged recovery but otherwise  She has h/o pulmonary stenosis  Good exercise tolerance  Takes celexa 40 mg daily  Does not take blood thinners.   ROS otherwise as per resident note    Past medical, surgical, family and social history were reviewed, non-contributory, and unchanged unless otherwise stated.    Objective:    /82   Pulse 65   Temp 98 °F (36.7 °C) (Temporal)   Resp 16   Ht 1.6 m (5' 2.99\")   Wt 79.8 kg (176 lb)   SpO2 98%   BMI 31.18 kg/m²     Exam is as noted by resident     Assessment/Plan:    Preoperative evaluation  Low risk to proceed with planned wisdom teeth removal  Noted prolonged response to anesthesia - anesthesia aware; will be monitored  Cerebral palsy  OT evaluation  No further testing indicated at this time.     Attending Physician Statement  I have reviewed the chart, including any radiology or labs, and have seen the patient with the resident(s).  I personally reviewed and performed key elements of the history and exam.  I agree with the assessment, plan and orders as documented by the resident.  Please refer to the resident note for additional information.      Electronically signed by Shania Ovalle MD on 3/5/2024 at 8:49 AM    
(36.7 °C) (Temporal)   Resp 16   Ht 1.6 m (5' 2.99\")   Wt 79.8 kg (176 lb)   SpO2 98%   BMI 31.18 kg/m²   Physical Exam  Constitutional:       General: She is not in acute distress.  HENT:      Head: Normocephalic and atraumatic.   Eyes:      Extraocular Movements: Extraocular movements intact.   Cardiovascular:      Rate and Rhythm: Normal rate and regular rhythm.      Heart sounds: No murmur heard.     No friction rub. No gallop.   Pulmonary:      Breath sounds: Normal breath sounds. No wheezing, rhonchi or rales.   Abdominal:      Tenderness: There is no abdominal tenderness. There is no guarding.   Musculoskeletal:      Right lower leg: No edema.      Left lower leg: No edema.      Comments: Unable to suppinate left upper extremity, chronic   Neurological:      Mental Status: She is alert.           ASSESSMENT AND PLAN     1. Preoperative examination  Previous cardiology workup completed 2017 for palpitations, notes reviewed in chart including EKG. No need to repeat workup, asymptomatic at this time.     There is medically acceptable risk for the proposed surgery which may continue as planned. Defer discussion of risks, benefits, and adverse outcomes to the performing surgeon.  Copy of this note to be faxed to surgeon.    Follow up prn     2. Left-sided hemiplegic cerebral palsy (HCC)  - External Referral To Occupational Therapy      Counseled regarding above diagnosis, including possible risks and complications, especially if left uncontrolled. Counseled regarding the possible side effects, risks, benefits and alternatives to treatment; patient and/or guardian verbalizes understanding, agrees, feels comfortable with and wishes to proceed with above treatment plan.    Call or go to ED immediately if symptoms worsen or persist. Advised patient to call with any new medication issues, and, as applicable, read all Rx info from pharmacy to assure aware of all possible risks and side effects of medication before

## 2024-08-19 ENCOUNTER — TELEMEDICINE (OUTPATIENT)
Dept: PSYCHOLOGY | Age: 24
End: 2024-08-19
Payer: COMMERCIAL

## 2024-08-19 DIAGNOSIS — F41.8 OTHER SPECIFIED ANXIETY DISORDERS: Primary | ICD-10-CM

## 2024-08-19 PROCEDURE — 1036F TOBACCO NON-USER: CPT | Performed by: PSYCHOLOGIST

## 2024-08-19 PROCEDURE — 90791 PSYCH DIAGNOSTIC EVALUATION: CPT | Performed by: PSYCHOLOGIST

## 2024-08-19 NOTE — PROGRESS NOTES
Behavioral Health Assessment   [facility]    Hanny Masters, was evaluated through a synchronous (real-time) audio-video encounter. The patient (and/or guardian if applicable) is aware that this is a billable service, which includes applicable co-pays. This virtual visit was conducted with patient's (and/or legal guardian's) consent. Patient identification was verified, and a caregiver was present when appropriate. Video visit due to limitations in getting to office due to work schedule and transportation (does not drive).  The patient was located at Home: [address] [place], [place] [id number]  The provider was located at Facility (Login Dept): [facility]  [address] [id number] [phone]  Confirm you are appropriately licensed, registered, or certified to deliver care in the [facility] where the patient is located as indicated above. If you are not or unsure, please re-schedule the visit: Yes, I confirm.   Consults     Total time spent on this encounter:  50 minutes      Time Start: 8:00 a.m.   Time End:  8:45 a.m.  Date of Service:  8/19/2024    REFERRAL INFORMATION  Referred by: Self, returning patient   Reason for referral:  Racing thoughts, interpersonal interactions    BASIS OF EVALUATION:  Patient Interview, Record Review, PHQ-9, and PARKER-7      PRESENTING CONCERNS AND HISTORY OF PRESENT CONDITION   Hanny is returning for services to address frustrations/racing thoughts with interacting with a co-worker. She has had services for anxiety from this provider intermittently since around 2018 (age 18).    Got new job at a  as a  in Jan 2024. 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 Full-time.  M-F 10-6pm.  Frustrations with one teacher whom she assists with reprimanding her for not being in her room at the right time, although the problem was more with logistics of scheduling and communication, rather than

## 2024-09-17 ENCOUNTER — TELEMEDICINE (OUTPATIENT)
Dept: PSYCHOLOGY | Age: 24
End: 2024-09-17
Payer: COMMERCIAL

## 2024-09-17 DIAGNOSIS — F41.8 OTHER SPECIFIED ANXIETY DISORDERS: Primary | ICD-10-CM

## 2024-09-17 PROCEDURE — 1036F TOBACCO NON-USER: CPT | Performed by: PSYCHOLOGIST

## 2024-09-17 PROCEDURE — 90832 PSYTX W PT 30 MINUTES: CPT | Performed by: PSYCHOLOGIST

## 2024-11-25 ENCOUNTER — TELEMEDICINE (OUTPATIENT)
Dept: PSYCHOLOGY | Age: 24
End: 2024-11-25
Payer: COMMERCIAL

## 2024-11-25 DIAGNOSIS — F41.8 OTHER SPECIFIED ANXIETY DISORDERS: Primary | ICD-10-CM

## 2024-11-25 PROCEDURE — 1036F TOBACCO NON-USER: CPT | Performed by: PSYCHOLOGIST

## 2024-11-25 PROCEDURE — 90837 PSYTX W PT 60 MINUTES: CPT | Performed by: PSYCHOLOGIST

## 2024-11-25 NOTE — PROGRESS NOTES
it to affect my life today.      6. This was one episode of PT that had major flaws.  Most of my PT went exactly how it should have.   If I have had 100 PT sessions, 1 went wrong.  That means 99% of the time it went right.    7.  My parents made the right decisions for me. If they had not kept up with my care, I may not be able to walk now.    8.  At 6-9 years old I could not have made the right decisions for myself, no child can.  Those surgeries had to happen while I was still growing.  Having them later would have been too late.       Care Coordination:  N/A    Treatment goal(s) addressed: To reduce the frequency and severity of anxiety and depression symptoms.  To facilitate pursuit of educational and occupational goals.     Plan:  Homework/recommendations:   Keep a diary of times the situation with your physical therapist came up.  Did anything trigger it?  Did it come out of the blue?  What was your response?  (Went to room, cried, argued, etc).     Complete PTSD checklist.   Call this provider for any worsening of suicidal thoughts.  Call 911 or go to ED for any imminent risk.    Follow up: 12/10/24, 2 weeks    Electronically signed by Lelo Harper, PhD    _____________________________________________________________________________________

## 2024-11-25 NOTE — PATIENT INSTRUCTIONS
WAYS TO COPE WITH STRESS FROM MEMORIES OF PHYSICAL THERAPY       Allow yourself to cry and let the thoughts flow.  They will pass.  Trying not to think about the incident will actually make it worse.    2.   Because mom is not helpful, do not tell her for now.     3.   When thinking about the incident, watch TV such as \"[employer].\"    4.  Consider the physical therapists point of view during the sessions, even if you don't agree with it. The delivery wasn't good and she could have made better choices, however the end result was that you walked.      5. It is over, it's done.  I can disagree with how it happened, and not allow it to affect my life today.      6. This was one episode of PT that had major flaws.  Most of my PT went exactly how it should have.   If I have had 100 PT sessions, 1 went wrong.  That means 99% of the time it went right.    7.  My parents made the right decisions for me. If they had not kept up with my care, I may not be able to walk now.    8.  At 6-9 years old I could not have made the right decisions for myself, no child can.  Those surgeries had to happen while I was still growing.  Having them later would have been too late.       Keep a diary of times the situation with your physical therapist came up.  Did anything trigger it?  Did it come out of the blue?  What was your response?  (Went to room, cried, argued, etc).

## 2024-12-10 ENCOUNTER — TELEMEDICINE (OUTPATIENT)
Dept: PSYCHOLOGY | Age: 24
End: 2024-12-10
Payer: COMMERCIAL

## 2024-12-10 DIAGNOSIS — F41.8 OTHER SPECIFIED ANXIETY DISORDERS: Primary | ICD-10-CM

## 2024-12-10 PROCEDURE — 1036F TOBACCO NON-USER: CPT | Performed by: PSYCHOLOGIST

## 2024-12-10 PROCEDURE — 90832 PSYTX W PT 30 MINUTES: CPT | Performed by: PSYCHOLOGIST

## 2024-12-10 ASSESSMENT — ANXIETY QUESTIONNAIRES
GAD7 TOTAL SCORE: 0
3. WORRYING TOO MUCH ABOUT DIFFERENT THINGS: NOT AT ALL
7. FEELING AFRAID AS IF SOMETHING AWFUL MIGHT HAPPEN: NOT AT ALL
4. TROUBLE RELAXING: NOT AT ALL
6. BECOMING EASILY ANNOYED OR IRRITABLE: NOT AT ALL
1. FEELING NERVOUS, ANXIOUS, OR ON EDGE: NOT AT ALL
2. NOT BEING ABLE TO STOP OR CONTROL WORRYING: NOT AT ALL
5. BEING SO RESTLESS THAT IT IS HARD TO SIT STILL: NOT AT ALL

## 2024-12-10 ASSESSMENT — PATIENT HEALTH QUESTIONNAIRE - PHQ9
4. FEELING TIRED OR HAVING LITTLE ENERGY: NOT AT ALL
SUM OF ALL RESPONSES TO PHQ QUESTIONS 1-9: 0
1. LITTLE INTEREST OR PLEASURE IN DOING THINGS: NOT AT ALL
SUM OF ALL RESPONSES TO PHQ9 QUESTIONS 1 & 2: 0
SUM OF ALL RESPONSES TO PHQ QUESTIONS 1-9: 0
5. POOR APPETITE OR OVEREATING: NOT AT ALL
6. FEELING BAD ABOUT YOURSELF - OR THAT YOU ARE A FAILURE OR HAVE LET YOURSELF OR YOUR FAMILY DOWN: NOT AT ALL
8. MOVING OR SPEAKING SO SLOWLY THAT OTHER PEOPLE COULD HAVE NOTICED. OR THE OPPOSITE, BEING SO FIGETY OR RESTLESS THAT YOU HAVE BEEN MOVING AROUND A LOT MORE THAN USUAL: NOT AT ALL
SUM OF ALL RESPONSES TO PHQ QUESTIONS 1-9: 0
9. THOUGHTS THAT YOU WOULD BE BETTER OFF DEAD, OR OF HURTING YOURSELF: NOT AT ALL
3. TROUBLE FALLING OR STAYING ASLEEP: NOT AT ALL
7. TROUBLE CONCENTRATING ON THINGS, SUCH AS READING THE NEWSPAPER OR WATCHING TELEVISION: NOT AT ALL
SUM OF ALL RESPONSES TO PHQ QUESTIONS 1-9: 0
2. FEELING DOWN, DEPRESSED OR HOPELESS: NOT AT ALL

## 2024-12-10 NOTE — PROGRESS NOTES
Behavioral Health Follow-Up   [facility]    Hanny Masters, was evaluated through a synchronous (real-time) audio-video encounter. The patient (and/or guardian if applicable) is aware that this is a billable service, which includes applicable co-pays. This virtual visit was conducted with patient's (and/or legal guardian's) consent. Patient identification was verified, and a caregiver was present when appropriate. Virtual visit completed due to patient preference and lack of transportation for in-person visit.   The patient was located at Home: [address] [place], [place] [id number]  The provider was located at Facility (Login Dept): [facility]  [address] [id number]  [phone]  Confirm you are appropriately licensed, registered, or certified to deliver care in the [facility] where the patient is located as indicated above. If you are not or unsure, please re-schedule the visit: Yes, I confirm.        Total time spent on this encounter:  20 minutes          Time Start: 8:00 a.m.   Time End:  8:20 a.m.  Date of Service:  12/10/2024  Session #: 13    Symptoms reported:   Occasionally has intrusive thoughts about an incident around the age of 11 or 12 when she feels she had inappropriate care from a PT.     Impact on functioning: none      Focused Mental Status:    Appearance: Appears stated age   Affect:  restricted   Mood:   Dysphoric   Thought Process:  Goal-Directed   Behavior:   Cooperative   Psychomotor: Relaxed     PHQ-9 = 0  PARKER-7 = 0  PTSD checklist = 18    Progress/response to treatment:   Since initiation of treatment: Anxiety and depression generally well controlled.  Since last visit: Successfully completing college classes, got new job where she is doing well ([employer] ).     Risk: denies SI/HI    Protective Factors: denial of impulses, hopeful for improved circumstances, future goals, and making progress in treatment      ICD-10-CM    1. Other

## 2025-06-16 ENCOUNTER — OFFICE VISIT (OUTPATIENT)
Dept: FAMILY MEDICINE CLINIC | Age: 25
End: 2025-06-16
Payer: COMMERCIAL

## 2025-06-16 VITALS
BODY MASS INDEX: 30.83 KG/M2 | TEMPERATURE: 98 F | HEART RATE: 88 BPM | RESPIRATION RATE: 19 BRPM | DIASTOLIC BLOOD PRESSURE: 70 MMHG | HEIGHT: 63 IN | OXYGEN SATURATION: 97 % | WEIGHT: 174 LBS | SYSTOLIC BLOOD PRESSURE: 108 MMHG

## 2025-06-16 DIAGNOSIS — R11.0 NAUSEA: Primary | ICD-10-CM

## 2025-06-16 DIAGNOSIS — R42 LIGHTHEADEDNESS: ICD-10-CM

## 2025-06-16 PROCEDURE — 99213 OFFICE O/P EST LOW 20 MIN: CPT

## 2025-06-16 PROCEDURE — G8427 DOCREV CUR MEDS BY ELIG CLIN: HCPCS

## 2025-06-16 PROCEDURE — G8417 CALC BMI ABV UP PARAM F/U: HCPCS

## 2025-06-16 PROCEDURE — 1036F TOBACCO NON-USER: CPT

## 2025-06-16 RX ORDER — ONDANSETRON 4 MG/1
4 TABLET, ORALLY DISINTEGRATING ORAL EVERY 8 HOURS PRN
Qty: 21 TABLET | Refills: 0 | Status: SHIPPED | OUTPATIENT
Start: 2025-06-16

## 2025-06-16 SDOH — ECONOMIC STABILITY: INCOME INSECURITY: IN THE LAST 12 MONTHS, WAS THERE A TIME WHEN YOU WERE NOT ABLE TO PAY THE MORTGAGE OR RENT ON TIME?: NO

## 2025-06-16 SDOH — ECONOMIC STABILITY: FOOD INSECURITY: WITHIN THE PAST 12 MONTHS, YOU WORRIED THAT YOUR FOOD WOULD RUN OUT BEFORE YOU GOT MONEY TO BUY MORE.: NEVER TRUE

## 2025-06-16 SDOH — ECONOMIC STABILITY: FOOD INSECURITY: WITHIN THE PAST 12 MONTHS, THE FOOD YOU BOUGHT JUST DIDN'T LAST AND YOU DIDN'T HAVE MONEY TO GET MORE.: NEVER TRUE

## 2025-06-16 SDOH — ECONOMIC STABILITY: TRANSPORTATION INSECURITY
IN THE PAST 12 MONTHS, HAS THE LACK OF TRANSPORTATION KEPT YOU FROM MEDICAL APPOINTMENTS OR FROM GETTING MEDICATIONS?: NO

## 2025-06-16 SDOH — ECONOMIC STABILITY: TRANSPORTATION INSECURITY
IN THE PAST 12 MONTHS, HAS LACK OF TRANSPORTATION KEPT YOU FROM MEETINGS, WORK, OR FROM GETTING THINGS NEEDED FOR DAILY LIVING?: NO

## 2025-06-16 ASSESSMENT — PATIENT HEALTH QUESTIONNAIRE - PHQ9
1. LITTLE INTEREST OR PLEASURE IN DOING THINGS: NOT AT ALL
SUM OF ALL RESPONSES TO PHQ QUESTIONS 1-9: 0
2. FEELING DOWN, DEPRESSED OR HOPELESS: NOT AT ALL
2. FEELING DOWN, DEPRESSED OR HOPELESS: NOT AT ALL
1. LITTLE INTEREST OR PLEASURE IN DOING THINGS: NOT AT ALL
SUM OF ALL RESPONSES TO PHQ9 QUESTIONS 1 & 2: 0
SUM OF ALL RESPONSES TO PHQ QUESTIONS 1-9: 0

## 2025-06-16 NOTE — PROGRESS NOTES
[facility]    CC: Hanny Masters is a 24 y.o. yo female is here for evaluation evaluation for the following medical concerns: Vomiting      HPI:    PMH left-sided hemiplegic CP, anxiety    Nausea:  2 weeks ago vomiting x1, ate ice cream  Last night felt nauseated, dry heaving after eating ice cream  Was on non-lactose formula as baby  Denies fevers, chills, sick contacts  Denies intake besides Gatorade this am  Wondering if she has lactose intolerance  Denies gas, diarrhea, bloating  Reports normal bms  Denies congestion, sore throat  Reports strong family hx lactose intolerance  Few hours in between eating and nausea   Eats cheese once a week, via mac and cheese, denies stomach upset  Thinks could be hormonal--would vomit around menstruation and told by pediatrician her adolescent nausea was cycle-related  Some lightheadedness positionally today    ROS as above.    Vitals:   /70   Pulse 88   Temp 98 °F (36.7 °C) (Temporal)   Resp 19   Ht 1.6 m (5' 3\")   Wt 78.9 kg (174 lb)   SpO2 97%   BMI 30.82 kg/m²   Wt Readings from Last 3 Encounters:   06/16/25 78.9 kg (174 lb)   03/05/24 79.8 kg (176 lb)   12/29/23 78 kg (172 lb)       PE:  Physical Exam  Constitutional:       General: She is not in acute distress.  HENT:      Head: Normocephalic and atraumatic.   Eyes:      Extraocular Movements: Extraocular movements intact.   Cardiovascular:      Rate and Rhythm: Normal rate and regular rhythm.      Heart sounds: No murmur heard.     No friction rub. No gallop.   Pulmonary:      Breath sounds: Normal breath sounds. No wheezing, rhonchi or rales.   Abdominal:      Tenderness: There is abdominal tenderness. There is no guarding.   Musculoskeletal:      Right lower leg: No edema.      Left lower leg: No edema.   Neurological:      Mental Status: She is alert.         A / P:     Diagnosis Orders   1. Nausea  ondansetron (ZOFRAN-ODT) 4 MG disintegrating tablet      2.

## 2025-06-16 NOTE — PROGRESS NOTES
S: 24 y.o. female with   Chief Complaint   Patient presents with    Vomiting       Pt is here bc of nausea.  She had vomiting about 2 weeks ago and then she was normal.  She then had this again today.  No gas,no bloating, no diarrhea.  Moving bowels fine.    O: VS:  height is 1.6 m (5' 3\") and weight is 78.9 kg (174 lb). Her temporal temperature is 98 °F (36.7 °C). Her blood pressure is 108/70 and her pulse is 88. Her respiration is 19 and oxygen saturation is 97%.   BP Readings from Last 3 Encounters:   06/16/25 108/70   03/05/24 117/82   12/29/23 112/85     See resident note      Impression/Plan:   1) nausea - try zofran.  Keep food and menstrual cycle log.    2) lightheadness - increase fluid with electrolyte intake.      Health Maintenance Due   Topic Date Due    HPV vaccine (1 - 3-dose series) Never done    HIV screen  Never done    Meningococcal B vaccine (1 of 2 - Standard) Never done    Chlamydia/GC screen  Never done    Varicella vaccine (2 of 2 - 2-dose childhood series) 06/18/2019    Hepatitis C screen  Never done    Pap smear  Never done    COVID-19 Vaccine (1 - 2024-25 season) Never done         Attending Physician Statement  I have discussed the case, including pertinent history and exam findings with the resident.  I agree with the documented assessment and plan.      Sophy Fonseca MD

## 2025-06-17 PROBLEM — R11.0 NAUSEA: Status: ACTIVE | Noted: 2025-06-17
